# Patient Record
Sex: MALE | Race: OTHER | NOT HISPANIC OR LATINO | URBAN - METROPOLITAN AREA
[De-identification: names, ages, dates, MRNs, and addresses within clinical notes are randomized per-mention and may not be internally consistent; named-entity substitution may affect disease eponyms.]

---

## 2017-07-19 ENCOUNTER — EMERGENCY (EMERGENCY)
Facility: HOSPITAL | Age: 27
LOS: 1 days | Discharge: HOME | End: 2017-07-19
Attending: INTERNAL MEDICINE
Payer: SELF-PAY

## 2017-07-19 VITALS
OXYGEN SATURATION: 100 % | DIASTOLIC BLOOD PRESSURE: 81 MMHG | HEIGHT: 67 IN | RESPIRATION RATE: 18 BRPM | TEMPERATURE: 99 F | WEIGHT: 127.87 LBS | HEART RATE: 69 BPM | SYSTOLIC BLOOD PRESSURE: 122 MMHG
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
## 2017-07-19 DIAGNOSIS — S02.5XXA FRACTURE OF TOOTH (TRAUMATIC), INITIAL ENCOUNTER FOR CLOSED FRACTURE: ICD-10-CM
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
## 2017-07-19 PROCEDURE — 99283 EMERGENCY DEPT VISIT LOW MDM: CPT

## 2017-07-19 PROCEDURE — 96372 THER/PROPH/DIAG INJ SC/IM: CPT

## 2017-07-19 PROCEDURE — 99283 EMERGENCY DEPT VISIT LOW MDM: CPT | Mod: 25

## 2017-07-19 RX ORDER — KETOROLAC TROMETHAMINE 30 MG/ML
60 SYRINGE (ML) INJECTION ONCE
Qty: 0 | Refills: 0 | Status: DISCONTINUED | OUTPATIENT
Start: 2017-07-19 | End: 2017-07-19

## 2017-07-19 RX ORDER — IBUPROFEN 200 MG
1 TABLET ORAL
Qty: 20 | Refills: 0 | OUTPATIENT
Start: 2017-07-19

## 2017-07-19 RX ADMIN — Medication 60 MILLIGRAM(S): at 20:53

## 2017-07-19 RX ADMIN — Medication 60 MILLIGRAM(S): at 20:13

## 2017-07-19 NOTE — ED PROVIDER NOTE - OBJECTIVE STATEMENT
27 YOM with broken tooth for past 1-2 months, worsening pain at the fractured tooth over past 3 days, worse today with difficulty sleeping so came to ED. Denies fever, swelling, confusion, HA, CP/cough/SOB, AP/N/V/D.

## 2017-07-19 NOTE — ED ADULT NURSE NOTE - OBJECTIVE STATEMENT
C/O RIGHT LOWER TOOTH PAIN X1-2 MONTHS . WORSE THE PAST 3 DAYS. SEEN AND EXAMINED BY DR. BRENNER. WITH FX TO RIGHT LOWER PREMOLAR, WITH TENDERNESS , NO GUM SWELLING OR DISCHARGE NOTED. REFUSED REPEAT VS. FEEL BETTER AFTER TORADOL IM GIVEN FOR PAIN , INSTRUCTED TO FOLLOW UP WITH DENTIST , REFERA GIVEN
